# Patient Record
Sex: MALE | Race: WHITE | NOT HISPANIC OR LATINO | URBAN - METROPOLITAN AREA
[De-identification: names, ages, dates, MRNs, and addresses within clinical notes are randomized per-mention and may not be internally consistent; named-entity substitution may affect disease eponyms.]

---

## 2017-04-28 ENCOUNTER — EMERGENCY (EMERGENCY)
Facility: HOSPITAL | Age: 27
LOS: 1 days | Discharge: HOME | End: 2017-04-28
Attending: EMERGENCY MEDICINE | Admitting: EMERGENCY MEDICINE
Payer: COMMERCIAL

## 2017-04-28 VITALS
RESPIRATION RATE: 18 BRPM | SYSTOLIC BLOOD PRESSURE: 128 MMHG | HEART RATE: 88 BPM | DIASTOLIC BLOOD PRESSURE: 85 MMHG | TEMPERATURE: 98 F

## 2017-04-28 DIAGNOSIS — M54.2 CERVICALGIA: ICD-10-CM
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
## 2017-04-28 DIAGNOSIS — S16.1XXA STRAIN OF MUSCLE, FASCIA AND TENDON AT NECK LEVEL, INITIAL ENCOUNTER: ICD-10-CM

## 2017-04-28 PROCEDURE — 99283 EMERGENCY DEPT VISIT LOW MDM: CPT

## 2017-04-28 RX ORDER — IBUPROFEN 200 MG
800 TABLET ORAL ONCE
Qty: 0 | Refills: 0 | Status: COMPLETED | OUTPATIENT
Start: 2017-04-28 | End: 2017-04-28

## 2017-04-28 RX ADMIN — Medication 800 MILLIGRAM(S): at 13:30

## 2017-04-28 NOTE — ED PROVIDER NOTE - PHYSICAL EXAMINATION
NAD, VSS, Afebrile, No facial or scalp tender, No spinal mid tender, + B/L cervical trapezium mild tender without swelling or lesion, No RIB or CVA tender, Neuro- intact NAD, VSS, Afebrile, No facial or scalp tender, No spinal mid tender, + B/L cervical trapezium mild tender without swelling or lesion, No RIB or CVA tender, Neuro- intact       Attending note. The patient is alert and in no acute distress. Patient has tenderness in the paracervical area bilaterally with bilateral trapezius tenderness. Patient has full range of motion of his neck without neurologic symptoms. Back is nontender. Chest ribs are nontender. Abdomen is soft and nontender. Pelvis is nontender. Extremities are normal full range of motion. Patient is able to deep knee bend without difficulty. Patient has full range of motion of his lower back. Her exam is grossly intact.
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
## 2017-04-28 NOTE — ED PROVIDER NOTE - CHPI ED SYMPTOMS NEG
no disorientation/no back pain/no dizziness/no headache/no difficulty bearing weight/no bruising/no loss of consciousness/no laceration

## 2020-08-26 ENCOUNTER — APPOINTMENT (OUTPATIENT)
Dept: ORTHOPEDIC SURGERY | Facility: CLINIC | Age: 30
End: 2020-08-26
Payer: COMMERCIAL

## 2020-08-26 VITALS
SYSTOLIC BLOOD PRESSURE: 116 MMHG | DIASTOLIC BLOOD PRESSURE: 77 MMHG | HEIGHT: 75 IN | HEART RATE: 68 BPM | WEIGHT: 300 LBS | TEMPERATURE: 97.2 F | BODY MASS INDEX: 37.3 KG/M2

## 2020-08-26 DIAGNOSIS — M54.5 LOW BACK PAIN: ICD-10-CM

## 2020-08-26 DIAGNOSIS — M54.9 DORSALGIA, UNSPECIFIED: ICD-10-CM

## 2020-08-26 DIAGNOSIS — Z78.9 OTHER SPECIFIED HEALTH STATUS: ICD-10-CM

## 2020-08-26 PROCEDURE — 72110 X-RAY EXAM L-2 SPINE 4/>VWS: CPT

## 2020-08-26 PROCEDURE — 72170 X-RAY EXAM OF PELVIS: CPT | Mod: 59

## 2020-08-26 PROCEDURE — 99204 OFFICE O/P NEW MOD 45 MIN: CPT

## 2020-08-26 RX ORDER — DICLOFENAC SODIUM 75 MG/1
75 TABLET, DELAYED RELEASE ORAL
Qty: 30 | Refills: 0 | Status: ACTIVE | COMMUNITY
Start: 2020-08-26 | End: 1900-01-01

## 2020-08-26 RX ORDER — IBUPROFEN 200 MG/1
TABLET, COATED ORAL
Refills: 0 | Status: ACTIVE | COMMUNITY

## 2020-08-26 RX ORDER — METHOCARBAMOL 750 MG/1
750 TABLET, FILM COATED ORAL 4 TIMES DAILY
Qty: 45 | Refills: 2 | Status: ACTIVE | COMMUNITY
Start: 2020-08-26 | End: 1900-01-01

## 2020-08-27 NOTE — PHYSICAL EXAM
[Normal] : Gait: normal [LE] : 5/5 motor strength in bilateral lower extremities [Ankle] : ankle 2+ and symmetric bilaterally [DP] : dorsalis pedis 2+ and symmetric bilaterally [Knee] : patellar 2+ and symmetric bilaterally [PT] : posterior tibial 2+ and symmetric bilaterally [SLR] : negative straight leg raise [de-identified] : The pt is awake, alert and oriented to self, place and time, is comfortable and in no acute distress. Inspection of neck, back and lower extremities bilaterally reveals no rashes or ecchymotic lesions.  There is no obvious abnormal spinal curvature in the sagittal and coronal planes. There is no tenderness over the cervical, thoracic or lumbar spine, or the paraspinal or upper and lower extremities musculature. There is no sacroiliac tenderness. No greater trochanteric tenderness bilaterally. No atrophy or abnormal movements noted in the upper or lower extremities. There is no swelling noted in the upper or lower extremities bilaterally. No cervical lymphadenopathy noted anteriorly. No joint laxity noted in the upper and lower extremity joints bilaterally.\par Hip range of motion is degrees internal rotation 30° external rotation without pain. Full range of motion of the shoulders bilaterally with no significant pain\par There is no groin pain with hip internal rotation and a negative ANDREIA test bilaterally.  [de-identified] : 4 views lumbar spine demonstrate no significant scoliosis.  Normal lumbar lordosis.  Loss of disc height with trace retrolisthesis seen at L4-5 and mild endplate sclerosis.  Loss of disc height also suspected L5-S1 in the thoracolumbar junction down to L3.  Between flexion-extension no dynamic instability.  No obvious acute fractures.\par \par AP pelvis demonstrates normal appearance of the hips bilaterally.  No acute fractures.  No significant degeneration.

## 2020-08-27 NOTE — HISTORY OF PRESENT ILLNESS
[Worsening] : worsening [Bending] : worsened by bending [Daily] : ~He/She~ states the symptoms seem to be occuring daily [Lifting] : worsened by lifting [Rest] : relieved by rest [Prolonged Standing] : worsened by prolonged standing [de-identified] : Patient is here today for evaluation on his low back intermittent right leg pain going on for the past 2 days. Patient recently moved into new house doing a lot of work. Pain varies from a 5-10.\par Bought a house, doing a lot of renovations. landscaping. 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 \par Mostly back pain, some pain right leg posteriorly. Has had low back pain in the past, not as bad\par Took advil with limited relief.  [de-identified] : advil

## 2020-08-27 NOTE — DISCUSSION/SUMMARY
[Medication Risks Reviewed] : Medication risks reviewed [de-identified] : The patient presents with acute onset of back pain with right leg pain for the past 2 days.  He works as a  and has not worked significantly since March.  However he has been doing extensive home remodeling with lifting and carrying heavy items.  In this setting recommended activity modification for him with avoidance of bending twisting and lifting.  Prescribed physical therapy and recommended a self-directed home exercise program.  A lumbosacral orthosis was also prescribed for him along with diclofenac and methocarbamol.  I will see him back in 4 to 6 weeks for reevaluation but if his symptoms persist or worsen MRI lumbar spine may be considered at that time.\par \par The patient was educated regarding their condition, treatment options as well as prescribed course of treatment. \par Risks and benefits as well as alternatives to the proposed treatment were also provided to the patient \par They were given the opportunity to have all their questions answered to their satisfaction.\par \par Vital signs were reviewed with the patient and the patient was instructed to followup with their primary care provider for further management.\par \par Healthy lifestyle recommendations were also made including a tobacco free lifestyle, proper diet, and weight control.

## 2020-11-10 NOTE — ED PROVIDER NOTE - SKIN, MLM
Skin normal color for race, warm, dry and intact. No evidence of rash. Nsaids Pregnancy And Lactation Text: These medications are considered safe up to 30 weeks gestation. It is excreted in breast milk.

## 2020-11-16 ENCOUNTER — EMERGENCY (EMERGENCY)
Facility: HOSPITAL | Age: 30
LOS: 1 days | Discharge: LEFT BEFORE DISCHARGE | End: 2020-11-16
Payer: COMMERCIAL

## 2020-11-16 VITALS
SYSTOLIC BLOOD PRESSURE: 133 MMHG | RESPIRATION RATE: 16 BRPM | DIASTOLIC BLOOD PRESSURE: 88 MMHG | OXYGEN SATURATION: 98 % | TEMPERATURE: 99 F | HEART RATE: 96 BPM

## 2020-11-16 VITALS
RESPIRATION RATE: 16 BRPM | TEMPERATURE: 99 F | DIASTOLIC BLOOD PRESSURE: 83 MMHG | SYSTOLIC BLOOD PRESSURE: 139 MMHG | OXYGEN SATURATION: 99 % | HEART RATE: 96 BPM

## 2020-11-16 PROCEDURE — L9991: CPT

## 2020-11-16 NOTE — ED ADULT TRIAGE NOTE - NS ED NURSE DIRECT TO ROOM YN
VITALS AND I&OS DONE AND CHARTED. BEDSIDE TABLE AND CALL LIGHT WITHIN REACH.
TEETH SOAKING IN THE BATHROOM. AND LEG REPOSITIONED PER REQUEST OF PT. FRESH
ICE WATER GIVEN. PT NEEDS NOTHING ELSE AT THIS TIME. No

## 2024-05-30 ENCOUNTER — APPOINTMENT (OUTPATIENT)
Dept: ORTHOPEDIC SURGERY | Facility: CLINIC | Age: 34
End: 2024-05-30
Payer: OTHER MISCELLANEOUS

## 2024-05-30 VITALS
HEIGHT: 75 IN | HEART RATE: 90 BPM | DIASTOLIC BLOOD PRESSURE: 79 MMHG | SYSTOLIC BLOOD PRESSURE: 126 MMHG | WEIGHT: 280 LBS | BODY MASS INDEX: 34.82 KG/M2

## 2024-05-30 DIAGNOSIS — S49.92XA UNSPECIFIED INJURY OF LEFT SHOULDER AND UPPER ARM, INITIAL ENCOUNTER: ICD-10-CM

## 2024-05-30 PROCEDURE — 73080 X-RAY EXAM OF ELBOW: CPT | Mod: LT

## 2024-05-30 PROCEDURE — 99203 OFFICE O/P NEW LOW 30 MIN: CPT

## 2024-05-30 NOTE — DISCUSSION/SUMMARY
[de-identified] : Assessment: 33-year-old male with left forearm injury  Plan: I had a long discussion with the patient today regarding the nature of their diagnosis and treatment plan. We discussed the risks and benefits of no treatment as well as nonoperative and operative treatments.  I reviewed the patient's x-rays today with him in the office are negative for any acute pathology.  On examination the patient has pain about the lateral aspect of the proximal forearm approximately 6 to 8 cm distal to the lateral epicondyle with a small palpable defect appreciated.  He has reproducible pain with wrist extension in this area.  At this time I recommend an MRI of the left forearm to rule out a myotendinous injury of the extensor compartment of the forearm.  He will continue to manage himself symptomatically on his own at home.  He will avoid any heavy lifting at this time.  He will follow-up after MRI to discuss results and any further recommendations.  He may continue to work with mild to moderate partial temporary disability.  The patient verbalizes their understanding and agrees with the plan.  All questions were answered to their satisfaction.

## 2024-05-30 NOTE — HISTORY OF PRESENT ILLNESS
[Yes] : The patient is currently working. [Resumed full work: ______] : The patient resumed full work on [unfilled]. [de-identified] : DOI: 04/04/2024 5/30/2024: Ozzy is a pleasant 33-year-old right-hand-dominant male presents the office today for evaluation of a left elbow/forearm injury that occurred while lifting something heavy at work on 4/4/2024.  The patient states he felt a tearing sensation/popping sensation over the lateral aspect of his forearm and had immediate pain and subsequent swelling.  Since then he has had persistent pain and is unable to lift anything heavy.  The pain is activity related and alleviated by rest.  He has been resting the elbow and taking over-the-counter medication as needed for pain.  He has had no other formal treatment yet.  He denies any fevers, chills, sweats, or pain elsewhere. [Has the patient missed work because of the injury/illness?] : The patient has not missed work because of the injury/illness. [FreeTextEntry6] : 04/04/2024

## 2024-05-30 NOTE — PHYSICAL EXAM
[de-identified] : General: Awake, alert, no acute distress, Patient was cooperative and appropriate during the examination.  The patient's weight is of normal weight for height and age.  Walks without an antalgic gait.   Left elbow Exam: Physical exam of the elbow demonstrates normal skin without signs of skin changes or abnormalities. No erythema, warmth, or joint effusion appreciated.   Sensation intact light touch C5-T1 Palpable radial pulse Radial/ulnar/median/axillary/musculocutaneous/AIN/PIN nerves grossly intact  Range of motion: Flexion-Extension 0-145 degrees Pronation-Supination 85-85 degrees  Palpation: Not tender to palpation over the lateral epicondyle Not tender palpation over the medial epicondyle Not tender to palpation over the radial head Not tender to palpation over the olecranon Not tender to palpation over the distal biceps insertion Not tender to palpation over the distal triceps insertion Not tender to palpation over the cubital tunnel Mild to moderate tenderness palpation about the mobile wad approximately 6 to 8 cm distal from lateral epicondyle with small palpable defect appreciated  Strength testing: Elbow Flexion 5/5 Elbow Extension 5/5 with pain Pronation 5/5 Supination 5/5  Special Tests: No varus/valgus laxity at 0 and 30 degrees of elbow flexion Mild pain with resisted wrist/finger extension and forearm supination No pain with resisted wrist/finger flexion and forearm pronation Negative hook test Negative Tinel's over the carpal and cubital tunnels  [de-identified] : X-rays including 3 views of the left elbow and proximal forearm are obtained today.  No acute fracture or dislocation.  No arthritis.

## 2024-05-30 NOTE — REASON FOR VISIT
[Initial Visit] : an initial visit for [Workers' Comp: Date of Injury: _______] : This visit is related to worker's compensation. Date of Injury: [unfilled] [FreeTextEntry2] : left forearm injury

## 2024-06-13 ENCOUNTER — OUTPATIENT (OUTPATIENT)
Dept: OUTPATIENT SERVICES | Facility: HOSPITAL | Age: 34
LOS: 1 days | End: 2024-06-13
Payer: COMMERCIAL

## 2024-06-13 ENCOUNTER — APPOINTMENT (OUTPATIENT)
Dept: MRI IMAGING | Facility: CLINIC | Age: 34
End: 2024-06-13
Payer: OTHER MISCELLANEOUS

## 2024-06-13 DIAGNOSIS — Z00.00 ENCOUNTER FOR GENERAL ADULT MEDICAL EXAMINATION WITHOUT ABNORMAL FINDINGS: ICD-10-CM

## 2024-06-13 PROCEDURE — 73218 MRI UPPER EXTREMITY W/O DYE: CPT | Mod: 26,LT

## 2024-06-13 PROCEDURE — 73218 MRI UPPER EXTREMITY W/O DYE: CPT

## 2024-06-24 ENCOUNTER — APPOINTMENT (OUTPATIENT)
Dept: ORTHOPEDIC SURGERY | Facility: CLINIC | Age: 34
End: 2024-06-24

## 2024-06-24 PROCEDURE — 99447 NTRPROF PH1/NTRNET/EHR 11-20: CPT

## 2025-02-05 ENCOUNTER — NON-APPOINTMENT (OUTPATIENT)
Age: 35
End: 2025-02-05

## 2025-02-23 ENCOUNTER — NON-APPOINTMENT (OUTPATIENT)
Age: 35
End: 2025-02-23